# Patient Record
Sex: MALE | Race: OTHER
[De-identification: names, ages, dates, MRNs, and addresses within clinical notes are randomized per-mention and may not be internally consistent; named-entity substitution may affect disease eponyms.]

---

## 2020-08-25 ENCOUNTER — HOSPITAL ENCOUNTER (EMERGENCY)
Dept: HOSPITAL 74 - JER | Age: 25
LOS: 1 days | Discharge: HOME | End: 2020-08-26
Payer: COMMERCIAL

## 2020-08-25 VITALS — BODY MASS INDEX: 27.5 KG/M2

## 2020-08-25 VITALS — TEMPERATURE: 99.4 F

## 2020-08-25 DIAGNOSIS — J06.9: Primary | ICD-10-CM

## 2020-08-25 LAB
ALBUMIN SERPL-MCNC: 4.3 G/DL (ref 3.4–5)
ALP SERPL-CCNC: 88 U/L (ref 45–117)
ALT SERPL-CCNC: 31 U/L (ref 13–61)
ANION GAP SERPL CALC-SCNC: 10 MMOL/L (ref 8–16)
AST SERPL-CCNC: 23 U/L (ref 15–37)
BASOPHILS # BLD: 0.5 % (ref 0–2)
BILIRUB SERPL-MCNC: 0.4 MG/DL (ref 0.2–1)
BUN SERPL-MCNC: 12.3 MG/DL (ref 7–18)
CALCIUM SERPL-MCNC: 9.4 MG/DL (ref 8.5–10.1)
CHLORIDE SERPL-SCNC: 104 MMOL/L (ref 98–107)
CO2 SERPL-SCNC: 25 MMOL/L (ref 21–32)
CREAT SERPL-MCNC: 1.2 MG/DL (ref 0.55–1.3)
DEPRECATED RDW RBC AUTO: 13.6 % (ref 11.9–15.9)
EOSINOPHIL # BLD: 1.4 % (ref 0–4.5)
GLUCOSE SERPL-MCNC: 118 MG/DL (ref 74–106)
HCT VFR BLD CALC: 54.2 % (ref 35.4–49)
HGB BLD-MCNC: 17.5 GM/DL (ref 11.7–16.9)
INR BLD: 1.15 (ref 0.83–1.09)
LYMPHOCYTES # BLD: 18.4 % (ref 8–40)
MCH RBC QN AUTO: 25.6 PG (ref 25.7–33.7)
MCHC RBC AUTO-ENTMCNC: 32.3 G/DL (ref 32–35.9)
MCV RBC: 79.3 FL (ref 80–96)
MONOCYTES # BLD AUTO: 14.3 % (ref 3.8–10.2)
NEUTROPHILS # BLD: 65.4 % (ref 42.8–82.8)
PLATELET # BLD AUTO: 277 K/MM3 (ref 134–434)
PLATELET BLD QL SMEAR: NORMAL
PMV BLD: 9.7 FL (ref 7.5–11.1)
POTASSIUM SERPLBLD-SCNC: 3.7 MMOL/L (ref 3.5–5.1)
PROT SERPL-MCNC: 8.5 G/DL (ref 6.4–8.2)
PT PNL PPP: 13.6 SEC (ref 9.7–13)
RBC # BLD AUTO: 6.84 M/MM3 (ref 4–5.6)
SODIUM SERPL-SCNC: 140 MMOL/L (ref 136–145)
WBC # BLD AUTO: 6.6 K/MM3 (ref 4–10)

## 2020-08-25 PROCEDURE — 3E0337Z INTRODUCTION OF ELECTROLYTIC AND WATER BALANCE SUBSTANCE INTO PERIPHERAL VEIN, PERCUTANEOUS APPROACH: ICD-10-PCS

## 2020-08-25 PROCEDURE — U0003 INFECTIOUS AGENT DETECTION BY NUCLEIC ACID (DNA OR RNA); SEVERE ACUTE RESPIRATORY SYNDROME CORONAVIRUS 2 (SARS-COV-2) (CORONAVIRUS DISEASE [COVID-19]), AMPLIFIED PROBE TECHNIQUE, MAKING USE OF HIGH THROUGHPUT TECHNOLOGIES AS DESCRIBED BY CMS-2020-01-R: HCPCS

## 2020-08-25 NOTE — PDOC
*Physical Exam





- Vital Signs


                                Last Vital Signs











Temp Pulse Resp BP Pulse Ox


 


 99.4 F   140 H  20   159/110 H  100 


 


 08/25/20 18:40  08/25/20 18:40  08/25/20 18:40  08/25/20 18:40  08/25/20 18:40














ED Treatment Course





- LABORATORY


CBC & Chemistry Diagram: 


                                 08/25/20 21:30





                                 08/25/20 21:30





- ADDITIONAL ORDERS


Additional order review: 


                               Laboratory  Results











  08/25/20 08/25/20





  21:30 21:30


 


PT with INR   13.60 H


 


INR   1.15 H


 


Sodium  140 


 


Potassium  3.7 


 


Chloride  104 


 


Carbon Dioxide  25 


 


Anion Gap  10 


 


BUN  12.3 


 


Creatinine  1.2 


 


Est GFR (CKD-EPI)AfAm  98.89 


 


Est GFR (CKD-EPI)NonAf  85.32 


 


Random Glucose  118 H 


 


Calcium  9.4 


 


Total Bilirubin  0.4 


 


AST  23 


 


ALT  31 


 


Alkaline Phosphatase  88 


 


Total Protein  8.5 H 


 


Albumin  4.3 


 


TSH  1.11 








                                        











  08/25/20





  21:30


 


RBC  6.84 H


 


MCV  79.3 L


 


MCHC  32.3


 


RDW  13.6


 


MPV  9.7


 


Neutrophils %  65.4


 


Lymphocytes %  18.4


 


Monocytes %  14.3 H


 


Eosinophils %  1.4


 


Basophils %  0.5














- Medications


Given in the ED: 


ED Medications














Discontinued Medications














Generic Name Dose Route Start Last Admin





  Trade Name Jerod  PRN Reason Stop Dose Admin


 


Acetaminophen  975 mg  08/25/20 18:44  08/25/20 20:30





  Tylenol -  PO  08/25/20 18:45  975 mg





  ONCE ONE   Administration


 


Sodium Chloride  1,000 mls @ 1,000 mls/hr  08/25/20 22:30  08/25/20 23:27





  Normal Saline -  IV  08/25/20 23:29  1,000 mls/hr





  ASDIR STA   Administration


 


Pseudoephedrine HCl  30 mg  08/25/20 20:21  08/25/20 20:45





  Sudafed -  PO  08/25/20 20:22  30 mg





  ONCE ONE   Administration


 


Sodium Chloride  1,000 ml  08/25/20 18:44  08/25/20 20:30





  Normal Saline -  IV  08/25/20 18:45  1,000 ml





  ONCE ONE   Administration














Medical Decision Making





- Medical Decision Making





08/25/20 23:49


Signed out from Dr Yousif





25y previously healthy M presenting w 1d nasal congestion, SOB, non productive 

cough. Tachycardic 140 on admission


Likely covid (infiltrates XR) vs PE vs hyperthyroid





Given 1L NS, tylenol, sudafed





Signed out to reassess and monitor vitals





08/25/20 23:50


 following 2nd ivf bolus


patient feels well and states he feels nervous. no chest pain, sob, palp. 

requesting to go home. shared decision making process. Will return to the ED if 

acute worsening of symptoms








Discharge





- Discharge Information


Problems reviewed: Yes


Clinical Impression/Diagnosis: 


 Viral syndrome





Condition: Improved


Disposition: HOME





- Follow up/Referral





- Patient Discharge Instructions


Patient Printed Discharge Instructions:  [facility]-Coronavirus Instructions, [facility]-

[facility] COVID-19 Isolation Protocol


Additional Instructions: 


You may have the coronavirus infection





Please self quarantine for the next 14 days


You will be contacted with the results of your covid test in the next few days





Drink lots of water. Take tylenol 650mg every 6 hours as needed





Come back to the ED if you cannot breathe, persistent diarrhea, or severe chest 

pain





---








Puede tener la infeccin por coronavirus





Pngase en cuarentena sunday los prximos 14 harper


Ser contactado con los resultados de quinones prueba de covid en los prximos harper





Beber mucha agua. Nedra tylenol. 650mg cada 6 horas si necesitas





Regrese al servicio de urgencias si no puede respirar, si tiene diarrea persiste

nte o dolor de pecho intenso


Print Language: Kyrgyz





- Post Discharge Activity

## 2020-08-25 NOTE — PDOC
Documentation entered by Chery Genao SCRIBE, acting as scribe for 

Monique Persaud MD.








Monique Pesraud MD:  This documentation has been prepared by the Birgit prado Xhesika, SCRIBE, under my direction and personally reviewed by me in its 

entirety.  I confirm that the documentation accurately reflects all work, 

treatment, procedures, and medical decision making performed by me.  





Attending Attestation





- Resident


Resident Name: Phil Yousif





- ED Attending Attestation


I have performed the following: I have examined & evaluated the patient, The 

case was reviewed & discussed with the resident, I agree w/resident's findings &

plan, Exceptions are as noted





- HPI


HPI: 





08/25/20 20:03


The patient is a 25y/o M with no pmh who presents to the ED with nasal 

congestion, nonproductive cough, and SOB x1day. Pt states he took Tylenol this 

morning with no improvement of symptoms. Pt denies any recent COVID exposures.


The patient denies chest pain,  headache and dizziness. Denies fever, nausea, 

vomiting, diarrhea and constipation. Denies dysuria, frequency, urgency and 

hematuria.





Allergies: NKDA








- Physicial Exam


PE: 





08/25/20 20:30


GENERAL: tired but nontoxic-appearing young adult male, A/Ox4, no distress, 

answers questions appropriately


HEENT: PERRLA, EOMI, a bit dry mucous membranes


NECK/BACK: no midline ttp, no spinal step-off or deformity, no hematoma, full 

ROM, neck supple


CARDIOVASCULAR: apid regular rate, no MGR, strong peripheral pulses, capillary 

refill <2 seconds, extremities wwp, no edema


LUNGS/RESPIRATORY: no respiratory distress, CTAB


GI/ABDOMEN: symmetric side-to-side, normoactive BS, soft, no ttp, no midline 

pulsatile masses


: no CVA tenderness


MSK/EXTREMITIES: no muscle atrophy, no acute deformity


SKIN: warm and dry, no pallor, no jaundice, no rash, no pathologic-appearing 

bruising, no skin breakdown, no cuts, no lesions


NEUROLOGICAL: GCS 15, CN II-XII grossly intact, 5/5 strength proximally and 

distally, no facial droop








- Medical Decision Making





08/25/20 20:31


Patient presents with fever, SOB, cough, c/f COVID-19 in the setting of COVID-19

pandemic.





                               Initial Vital Signs











Temp Pulse Resp BP Pulse Ox


 


 99.4 F   140 H  20   159/110 H  100 


 


 08/25/20 18:40  08/25/20 18:40  08/25/20 18:40  08/25/20 18:40  08/25/20 18:40








DDX IBNLT: likely COVID-19 with c/f sequelae (ARDS, myocarditis). Superimposed 

bacterial PNA considered as well. Less likely influenza, bronchitis, other viral

URI, laryngitis, tracheitis, etc. Initially patient is tachycardic and with 

mildly elevated oral temp, no desaturations, so unlikely PE but will check 

rectal temp and fluid resuscitate and give Tylenol and re-assess VS.





                                 Provider Orders











 Category Date Time Status


 


 ELECTROCARDIOGRAM [CARD] Stat Cardiology  08/25/20 18:43 Ordered


 


 EKG needed NOW Care  08/25/20 18:44 Completed


 


 Isolation Precautions As directed Care  08/25/20 18:44 Active


 


 CBC WITH DIFFERENTIAL Stat Lab  08/25/20 21:30 Completed


 


 COMP METABOLIC PANEL Stat Lab  08/25/20 21:30 Completed


 


 COVID-19 Stat Lab  08/25/20 21:30 Received


 


 PT/INR (PROTHROMBIN TIME) Stat Lab  08/25/20 21:30 Completed


 


 THYROID STIMULATING HORMONE Stat Lab  08/25/20 21:30 Completed


 


 Acetaminophen [Tylenol -] Medication  08/25/20 20:42 Discontinued





 975 mg .ROUTE .STK-MED ONE   


 


 Acetaminophen [Tylenol -] Medication  08/25/20 18:44 Discontinued





 975 mg PO ONCE ONE   


 


 Pseudoephedrine HCl [Sudafed -] Medication  08/25/20 20:21 Discontinued





 30 mg PO ONCE ONE   


 


 Pseudoephedrine HCl [Sudafed -] Medication  08/25/20 20:43 Discontinued





 60 mg .ROUTE .STK-MED ONE   


 


 Sodium Chloride [Normal Saline -] Medication  08/25/20 18:44 Discontinued





 1,000 ml IV ONCE ONE   


 


 Sodium Chloride [Normal Saline -] 1,000 ml Medication  08/25/20 22:30 

Discontinued





 IV ASDIR   


 


 IV Insert NOW Phy Order  08/25/20 18:44 Active


 


 CHEST X-RAY PORTABLE* [RAD] Stat Radiology  08/25/20 20:00 Taken








                                   Medications











Discontinued Medications














Generic Name Dose Route Start Last Admin





  Trade Name Freq  PRN Reason Stop Dose Admin


 


Acetaminophen  975 mg  08/25/20 18:44  08/25/20 20:30





  Tylenol -  PO  08/25/20 18:45  975 mg





  ONCE ONE   Administration


 


Acetaminophen  Confirm  08/25/20 20:42 





  Tylenol -  Administered  08/25/20 20:43 





  Dose  





  975 mg  





  .ROUTE  





  .STK-MED ONE  


 


Sodium Chloride  1,000 mls @ 1,000 mls/hr  08/25/20 22:30  08/25/20 23:27





  Normal Saline -  IV  08/25/20 23:29  1,000 mls/hr





  ASDIR STA   Administration


 


Pseudoephedrine HCl  30 mg  08/25/20 20:21  08/25/20 20:45





  Sudafed -  PO  08/25/20 20:22  30 mg





  ONCE ONE   Administration


 


Pseudoephedrine HCl  Confirm  08/25/20 20:43 





  Sudafed -  Administered  08/25/20 20:44 





  Dose  





  60 mg  





  .ROUTE  





  .STK-MED ONE  


 


Sodium Chloride  1,000 ml  08/25/20 18:44  08/25/20 20:30





  Normal Saline -  IV  08/25/20 18:45  1,000 ml





  ONCE ONE   Administration








                                   Lab Results











WBC  6.6 K/mm3 (4.0-10.0)   08/25/20  21:30    


 


RBC  6.84 M/mm3 (4.00-5.60)  H  08/25/20  21:30    


 


Hgb  17.5 GM/dL (11.7-16.9)  H  08/25/20  21:30    


 


Hct  54.2 % (35.4-49)  H  08/25/20  21:30    


 


MCV  79.3 fl (80-96)  L  08/25/20  21:30    


 


MCH  25.6 pg (25.7-33.7)  L  08/25/20  21:30    


 


MCHC  32.3 g/dl (32.0-35.9)   08/25/20  21:30    


 


RDW  13.6 % (11.9-15.9)   08/25/20  21:30    


 


Plt Count  277 K/MM3 (134-434)   08/25/20  21:30    


 


MPV  9.7 fl (7.5-11.1)   08/25/20  21:30    


 


Absolute Neuts (auto)  4.3 K/mm3 (1.5-8.0)   08/25/20  21:30    


 


Neutrophils %  65.4 % (42.8-82.8)   08/25/20  21:30    


 


Lymphocytes %  18.4 % (8-40)   08/25/20 21:30    


 


Monocytes %  14.3 % (3.8-10.2)  H  08/25/20  21:30    


 


Eosinophils %  1.4 % (0-4.5)   08/25/20 21:30    


 


Basophils %  0.5 % (0-2.0)   08/25/20 21:30    


 


Nucleated RBC %  0 % (0-0)   08/25/20 21:30    


 


Platelet Estimate  Normal   08/25/20 21:30    


 


Platelet Comment  Present   08/25/20 21:30    


 


PT with INR  13.60 SEC (9.7-13.0)  H  08/25/20 21:30    


 


INR  1.15  (0.83-1.09)  H  08/25/20  21:30    


 


Sodium  140 mmol/L (136-145)   08/25/20 21:30    


 


Potassium  3.7 mmol/L (3.5-5.1)   08/25/20 21:30    


 


Chloride  104 mmol/L ()   08/25/20 21:30    


 


Carbon Dioxide  25 mmol/L (21-32)   08/25/20 21:30    


 


Anion Gap  10 MMOL/L (8-16)   08/25/20 21:30    


 


BUN  12.3 mg/dL (7-18)   08/25/20  21:30    


 


Creatinine  1.2 mg/dL (0.55-1.3)   08/25/20 21:30    


 


Est GFR (CKD-EPI)AfAm  98.89   08/25/20 21:30    


 


Est GFR (CKD-EPI)NonAf  85.32   08/25/20  21:30    


 


Random Glucose  118 mg/dL ()  H  08/25/20  21:30    


 


Calcium  9.4 mg/dL (8.5-10.1)   08/25/20  21:30    


 


Total Bilirubin  0.4 mg/dL (0.2-1)   08/25/20  21:30    


 


AST  23 U/L (15-37)   08/25/20  21:30    


 


ALT  31 U/L (13-61)   08/25/20 21:30    


 


Alkaline Phosphatase  88 U/L ()   08/25/20  21:30    


 


Total Protein  8.5 g/dl (6.4-8.2)  H  08/25/20  21:30    


 


Albumin  4.3 g/dl (3.4-5.0)   08/25/20  21:30    


 


TSH  1.11 uIU/ml (0.358-3.74)   08/25/20  21:30    








CXR: Diffuse bilateral patchy consolidations without focal or lobar 

consolidation.





08/25/20 21:01


At this time patient's HR is 100.





08/26/20 00:10


Patient's repeat HR after 2nd liter IVF is 104. He states he is anxious and does

 not like hospitals. No SOB, no chest pain





This Pt has gotten significant relief of symptoms while in the ED. The patient 

has no new hypoxia, significant respiratory distress, or other sxs requiring 

admission. On last reassessment, vitals are wnl and exam is benign. Workup is 

not concerning for emergency-level pathology at this time. This Pt is 

appropriate for discharge with close outpatient follow up. They are comfortable 

with this plan and will call PCP within 2 days for follow-up conversation. They 

are instructed on importance of quarantine and further outpatient testing. 

Discharge instructions for COVID-19 patients and household members given. 

Specific return precautions are discussed and they will come back to the ER if 

necessary.





**Heart Score/ECG Review


  ** #1





08/25/20 20:34


Sinus tachycardia, rate 113, normal axis and intervals, no ischemic ST-T changes





Discharge





- Discharge Information


Problems reviewed: Yes


Clinical Impression/Diagnosis: 


 Viral syndrome





Condition: Improved


Disposition: HOME





- Admission


No





- Follow up/Referral





- Patient Discharge Instructions


Patient Printed Discharge Instructions:  [facility]-Coronavirus Instructions, [facility]-

[facility] COVID-19 Isolation Protocol


Additional Instructions: 


You may have the coronavirus infection





Please self quarantine for the next 14 days


You will be contacted with the results of your covid test in the next few days





Drink lots of water. Take tylenol 650mg every 6 hours as needed





Come back to the ED if you cannot breathe, persistent diarrhea, or severe chest 

pain





---








Puede tener la infeccin por coronavirus





Pngase en cuarentena sunday los prximos 14 harper


Ser contactado con los resultados de quinones prueba de covid en los prximos harper





Beber mucha agua. Nedra tylenol. 650mg cada 6 horas si necesitas





Regrese al servicio de urgencias si no puede respirar, si tiene diarrea 

persistente o dolor de pecho intenso


Print Language: Ghanaian





- Post Discharge Activity


Work/Back to School Note:  Back to Work

## 2020-08-25 NOTE — PDOC
Rapid Medical Evaluation


Time Seen by Provider: 08/25/20 18:37


Medical Evaluation: 





08/25/20 18:37


HPI: 25 year old male no pmhx presenting with nasal congestion chills with loss 

of taste of smell





PE:


tachycardia 


CTA





A/P:


tachcardia to 140's 


O2 sat 100


Basic labs


Fluids 


Chest XR 


EKG





Pt to precede to ED for further evaluation and treatment.

## 2020-08-25 NOTE — PDOC
History of Present Illness





- General


Chief Complaint: Cold Symptoms


Stated Complaint: COUGH/CONGESTION


Time Seen by Provider: 08/25/20 18:37


History Source: Patient


Exam Limitations: No Limitations





- History of Present Illness


Initial Comments: 





08/25/20 20:00


25y previously healthy M presenting w 1d nasal congestion, SOB, non productive 

cough. Took tylenol this morning w/o relief. Denies recent covid exposure. 

Denies fever, n/v, chest, ABD pain, urinary/bowel mvmt changes. 





Past History





- Medical History


Allergies/Adverse Reactions: 


                                    Allergies











Allergy/AdvReac Type Severity Reaction Status Date / Time


 


No Known Allergies Allergy   Verified 08/25/20 18:39











COPD: No





- Immunization History


Immunization Up to Date: No





- Psycho-Social/Smoking History


Smoking History: Former smoker


Have you smoked in the past 12 months: No


Information on smoking cessation initiated: No





- Substance Abuse Hx (Audit-C & DAST Scrn)


How often the patient has a drink containing alcohol: Never


Score: In Men: 4 or > Positive; In Women: 3 or > Positive: 0


Screen Result (Pos requires Nsg. Audit-10AR): Negative


In the last yr the pt used illegal drug/Rx for NonMed reason: No


Score:  Yes response is considered Positive: 0


Screen Result (Positive result requires Nsg. DAST-10): Negative





**Review of Systems





- Review of Systems


Constitutional: No: Chills, Fever


HEENTM: Yes: Nose Congestion.  No: Eye Pain


Respiratory: Yes: Cough, Shortness of Breath


Cardiac (ROS): No: Chest Pain, Palpitations


ABD/GI: No: Constipated, Diarrhea, Nausea, Vomiting


: No: Burning, Dysuria


Musculoskeletal: No: Back Pain, Joint Pain


Integumentary: No: Bruising, Flushing


Neurological: No: Headache, Seizure


Psychiatric: No: Anxiety, Depression


Endocrine: No: Intolerance to Cold, Intolerance to Heat


Hematologic/Lymphatic: No: Anemia, Blood Clots





*Physical Exam





- Vital Signs


                                Last Vital Signs











Temp Pulse Resp BP Pulse Ox


 


 99.4 F   140 H  20   159/110 H  100 


 


 08/25/20 18:40  08/25/20 18:40  08/25/20 18:40  08/25/20 18:40  08/25/20 18:40














- Physical Exam


General Appearance: Yes: Nourished, Appropriately Dressed, Mild Distress


HEENT: positive: EOMI, DAVI, Normal Voice, Nasal Congestion, Hearing Grossly 

Normal.  negative: Scleral Icterus (R), Scleral Icterus (L)


Respiratory/Chest: positive: Lungs Clear, Normal Breath Sounds.  negative: Chest

Tender, Respiratory Distress


Cardiovascular: positive: Regular Rhythm, S1, S2, Tachycardia.  negative: Murmur


Gastrointestinal/Abdominal: positive: Normal Bowel Sounds, Flat, Soft.  

negative: Tender, Organomegaly


Extremity: positive: Delayed Capillary Refill


Integumentary: positive: Normal Color, Dry, Warm


Neurologic: positive: Fully Oriented, Alert, Normal Mood/Affect, Normal 

Response, Responsive





ED Treatment Course





- LABORATORY


CBC & Chemistry Diagram: 


                                 08/25/20 21:30





                                 08/25/20 21:30





Medical Decision Making





- Medical Decision Making





08/25/20 20:24


EKG - sinus tachycardia, , QTc 441, no ST changes


CXR - prakash fluffy hilar infiltrates





---





25y previously healthy M presenting w 1d nasal congestion, SOB, non productive 

cough. Tachycardic 140 on admission


Likely covid (infiltrates XR) vs PE vs hyperthyroid





Given 1L NS, tylenol, sudafed





Anticipate DC home





Signed out to night team


- consider d-dimer if pt persistently tachycardic after fluids given








Discharge





- Discharge Information


Problems reviewed: Yes


Clinical Impression/Diagnosis: 


 Suspected 2019 novel coronavirus infection





Condition: Improved


Disposition: HOME





- Follow up/Referral





- Patient Discharge Instructions


Patient Printed Discharge Instructions:  [facility]-Coronavirus Instructions, [facility]-

[facility] COVID-19 Isolation Protocol


Additional Instructions: 


You may have the coronavirus infection





Please self quarantine for the next 14 days


You will be contacted with the results of your covid test in the next few days





Drink lots of water





Come back to the ED if you cannot breathe, persistent diarrhea, or severe chest 

pain





---








Puede tener la infeccin por coronavirus





Pngase en cuarentena sunday los prximos 14 harper


Ser contactado con los resultados de quinones prueba de covid en los prximos harper





Beber mucha agua





Regrese al servicio de urgencias si no puede respirar, si tiene diarrea 

persistente o dolor de pecho intenso





- Post Discharge Activity

## 2020-08-26 VITALS — HEART RATE: 94 BPM | SYSTOLIC BLOOD PRESSURE: 124 MMHG | DIASTOLIC BLOOD PRESSURE: 72 MMHG

## 2020-08-26 NOTE — EKG
Test Reason : 

Blood Pressure : ***/*** mmHG

Vent. Rate : 113 BPM     Atrial Rate : 113 BPM

   P-R Int : 136 ms          QRS Dur : 074 ms

    QT Int : 322 ms       P-R-T Axes : 060 049 058 degrees

   QTc Int : 441 ms

 

SINUS TACHYCARDIA

POSSIBLE LEFT ATRIAL ENLARGEMENT

BORDERLINE ECG

NO PREVIOUS ECGS AVAILABLE

Confirmed by MD Konstantin, Levi ([id number]) on 8/26/2020 10:04:39 AM

 

Referred By:             Confirmed By:Levi Pryor MD

## 2021-03-29 ENCOUNTER — HOSPITAL ENCOUNTER (EMERGENCY)
Dept: HOSPITAL 74 - JERFT | Age: 26
Discharge: HOME | End: 2021-03-29
Payer: COMMERCIAL

## 2021-03-29 VITALS — BODY MASS INDEX: 26.9 KG/M2

## 2021-03-29 VITALS — TEMPERATURE: 99.5 F | DIASTOLIC BLOOD PRESSURE: 87 MMHG | HEART RATE: 98 BPM | SYSTOLIC BLOOD PRESSURE: 144 MMHG

## 2021-03-29 DIAGNOSIS — R07.0: ICD-10-CM

## 2021-03-29 DIAGNOSIS — R21: Primary | ICD-10-CM

## 2025-04-04 ENCOUNTER — HOSPITAL ENCOUNTER (EMERGENCY)
Dept: HOSPITAL 74 - JER | Age: 30
Discharge: HOME | End: 2025-04-04
Payer: COMMERCIAL

## 2025-04-04 VITALS — SYSTOLIC BLOOD PRESSURE: 134 MMHG | HEART RATE: 106 BPM | DIASTOLIC BLOOD PRESSURE: 89 MMHG

## 2025-04-04 VITALS — BODY MASS INDEX: 19.8 KG/M2

## 2025-04-04 VITALS — RESPIRATION RATE: 18 BRPM | TEMPERATURE: 98.1 F

## 2025-04-04 DIAGNOSIS — J18.9: Primary | ICD-10-CM

## 2025-04-04 DIAGNOSIS — R91.8: ICD-10-CM

## 2025-04-04 DIAGNOSIS — R00.0: ICD-10-CM

## 2025-04-04 LAB
ALBUMIN SERPL-MCNC: 3.4 G/DL (ref 3.4–5)
APPEARANCE UR: CLEAR
APTT BLD: 31.9 SECONDS (ref 25.2–36.5)
BILIRUB SERPL-MCNC: 0.4 MG/DL (ref 0.2–1)
BILIRUB UR STRIP.AUTO-MCNC: NEGATIVE MG/DL
CALCIUM SERPL-MCNC: 9.3 MG/DL (ref 8.5–10.1)
COLOR UR: YELLOW
ERYTHROCYTE [DISTWIDTH] IN BLOOD: 14.4 % (ref 11.9–15.3)
HGB BLD-MCNC: 14.1 G/DL (ref 13.7–17.5)
INR BLD: 1.3 (ref 0.83–1.09)
KETONES UR QL STRIP: (no result)
LEUKOCYTE ESTERASE UR QL STRIP.AUTO: NEGATIVE
MCHC RBC-ENTMCNC: 30.7 G/DL (ref 32.3–36.5)
MCV RBC: 76.9 FL (ref 79–92.2)
NITRITE UR QL STRIP: NEGATIVE
PLATELET # BLD AUTO: 409 X10^3/UL (ref 163–337)
PMV BLD: 9.4 FL (ref 9.4–12.4)
POTASSIUM SERPLBLD-SCNC: 3.8 MMOL/L (ref 3.5–5.1)
PROT SERPL-MCNC: 9.4 G/DL (ref 6.4–8.2)
PROT UR QL STRIP: NEGATIVE
PROT UR QL STRIP: NEGATIVE
PT PNL PPP: 14.2 SEC (ref 9.7–13)
SP GR UR: 1.03 (ref 1.01–1.03)
UROBILINOGEN UR STRIP-MCNC: 0.2 MG/DL (ref 0.2–1)

## 2025-04-04 PROCEDURE — 3E0337Z INTRODUCTION OF ELECTROLYTIC AND WATER BALANCE SUBSTANCE INTO PERIPHERAL VEIN, PERCUTANEOUS APPROACH: ICD-10-PCS

## 2025-04-04 RX ADMIN — SODIUM CHLORIDE STA MLS/HR: 9 INJECTION, SOLUTION INTRAVENOUS at 11:39

## 2025-06-06 ENCOUNTER — HOSPITAL ENCOUNTER (INPATIENT)
Dept: HOSPITAL 74 - JER | Age: 30
LOS: 1 days | Discharge: ANOTHER HOSPITAL | DRG: 892 | End: 2025-06-07
Attending: INTERNAL MEDICINE | Admitting: HOSPITALIST
Payer: COMMERCIAL

## 2025-06-06 VITALS — BODY MASS INDEX: 19.1 KG/M2

## 2025-06-06 VITALS — RESPIRATION RATE: 18 BRPM

## 2025-06-06 DIAGNOSIS — Z72.0: ICD-10-CM

## 2025-06-06 DIAGNOSIS — A31.0: Primary | ICD-10-CM

## 2025-06-06 DIAGNOSIS — R00.0: ICD-10-CM

## 2025-06-06 DIAGNOSIS — D50.9: ICD-10-CM

## 2025-06-06 DIAGNOSIS — E05.90: ICD-10-CM

## 2025-06-06 DIAGNOSIS — R91.8: ICD-10-CM

## 2025-06-06 DIAGNOSIS — B20: ICD-10-CM

## 2025-06-06 DIAGNOSIS — J90: ICD-10-CM

## 2025-06-06 LAB
ALBUMIN SERPL-MCNC: 2.7 G/DL (ref 3.4–5)
ALP SERPL-CCNC: 82 U/L (ref 45–117)
ALT SERPL-CCNC: 41 U/L (ref 13–61)
ANION GAP SERPL CALC-SCNC: 9 MMOL/L (ref 4–13)
APTT BLD: 30.8 SECONDS (ref 25.2–36.5)
AST SERPL-CCNC: 30 U/L (ref 15–37)
BASOPHILS # BLD AUTO: 0.03 X10^3/UL (ref 0.01–0.08)
BILIRUB SERPL-MCNC: 0.3 MG/DL (ref 0.2–1)
BUN SERPL-MCNC: 6.8 MG/DL (ref 7–18)
CALCIUM SERPL-MCNC: 9.3 MG/DL (ref 8.5–10.1)
CHLORIDE SERPL-SCNC: 106 MMOL/L (ref 98–107)
CO2 SERPL-SCNC: 23 MMOL/L (ref 21–32)
CREAT SERPL-MCNC: 0.7 MG/DL (ref 0.55–1.3)
EOSINOPHIL # BLD AUTO: 0.13 X10^3/UL (ref 0.04–0.54)
EOSINOPHIL NFR BLD AUTO: 1.5 % (ref 0.8–7)
ERYTHROCYTE [DISTWIDTH] IN BLOOD: 15.8 % (ref 11.9–15.3)
GLUCOSE SERPL-MCNC: 114 MG/DL (ref 74–106)
HCT VFR BLD CALC: 38.7 % (ref 40.1–51)
HGB BLD-MCNC: 12.1 G/DL (ref 13.7–17.5)
IMM GRANULOCYTES # BLD: 0.05 X10^3/UL (ref 0–0.03)
INR BLD: 1.6 (ref 0.83–1.09)
MCHC RBC-ENTMCNC: 31.3 G/DL (ref 32.3–36.5)
MCV RBC: 72.2 FL (ref 79–92.2)
MONOCYTES # BLD AUTO: 1.44 X10^3/UL (ref 0.3–0.82)
MONOCYTES NFR BLD AUTO: 16.6 % (ref 5.3–12.2)
PLATELET # BLD AUTO: 458 X10^3/UL (ref 163–337)
PLATELETS.RETICULATED NFR BLD AUTO: (no result) % (ref 0.9–11.2)
PLATELETS.RETICULATED NFR BLD AUTO: (no result) X10^3/UL
PMV BLD: 9.4 FL (ref 9.4–12.4)
POTASSIUM SERPLBLD-SCNC: 3.7 MMOL/L (ref 3.5–5.1)
PROT SERPL-MCNC: 8.3 G/DL (ref 6.4–8.2)
PT PNL PPP: 17.6 SEC (ref 9.7–13)
SODIUM SERPL-SCNC: 139 MMOL/L (ref 136–145)

## 2025-06-07 VITALS — HEART RATE: 86 BPM | DIASTOLIC BLOOD PRESSURE: 79 MMHG | TEMPERATURE: 97.7 F | SYSTOLIC BLOOD PRESSURE: 119 MMHG

## 2025-06-07 LAB
ALBUMIN SERPL-MCNC: 2.6 G/DL (ref 3.4–5)
ALP SERPL-CCNC: 77 U/L (ref 45–117)
ALT SERPL-CCNC: 41 U/L (ref 13–61)
ANION GAP SERPL CALC-SCNC: 6 MMOL/L (ref 4–13)
AST SERPL-CCNC: 26 U/L (ref 15–37)
BASOPHILS # BLD AUTO: 0.03 X10^3/UL (ref 0.01–0.08)
BILIRUB SERPL-MCNC: 0.5 MG/DL (ref 0.2–1)
BUN SERPL-MCNC: 6.7 MG/DL (ref 7–18)
CALCIUM SERPL-MCNC: 9.3 MG/DL (ref 8.5–10.1)
CHLORIDE SERPL-SCNC: 104 MMOL/L (ref 98–107)
CO2 SERPL-SCNC: 26 MMOL/L (ref 21–32)
CREAT SERPL-MCNC: 0.8 MG/DL (ref 0.55–1.3)
EOSINOPHIL # BLD AUTO: 0.25 X10^3/UL (ref 0.04–0.54)
EOSINOPHIL NFR BLD AUTO: 3.2 % (ref 0.8–7)
ERYTHROCYTE [DISTWIDTH] IN BLOOD: 15.7 % (ref 11.9–15.3)
GLUCOSE SERPL-MCNC: 104 MG/DL (ref 74–106)
HCT VFR BLD CALC: 40.8 % (ref 40.1–51)
HGB BLD-MCNC: 12.7 G/DL (ref 13.7–17.5)
IMM GRANULOCYTES # BLD: 0.03 X10^3/UL (ref 0–0.03)
IRON SERPL-MCNC: 44 UG/DL (ref 50–175)
MAGNESIUM SERPL-MCNC: 2.1 MG/DL (ref 1.8–2.4)
MCHC RBC-ENTMCNC: 31.1 G/DL (ref 32.3–36.5)
MCV RBC: 72.1 FL (ref 79–92.2)
MONOCYTES # BLD AUTO: 1.29 X10^3/UL (ref 0.3–0.82)
MONOCYTES NFR BLD AUTO: 16.6 % (ref 5.3–12.2)
PHOSPHATE SERPL-MCNC: 4 MG/DL (ref 2.5–4.9)
PLATELET # BLD AUTO: 484 X10^3/UL (ref 163–337)
PLATELETS.RETICULATED NFR BLD AUTO: (no result) % (ref 0.9–11.2)
PLATELETS.RETICULATED NFR BLD AUTO: (no result) X10^3/UL
PMV BLD: 9.4 FL (ref 9.4–12.4)
POTASSIUM SERPLBLD-SCNC: 3.8 MMOL/L (ref 3.5–5.1)
PROT SERPL-MCNC: 8.2 G/DL (ref 6.4–8.2)
RETICS # AUTO: 0.83 % (ref 0.51–1.81)
SODIUM SERPL-SCNC: 136 MMOL/L (ref 136–145)
TIBC SERPL-MCNC: 169 UG/DL (ref 250–450)

## 2025-06-07 RX ADMIN — ENOXAPARIN SODIUM SCH MG: 40 INJECTION SUBCUTANEOUS at 10:33

## 2025-06-07 RX ADMIN — SULFAMETHOXAZOLE AND TRIMETHOPRIM SCH EACH: 800; 160 TABLET ORAL at 10:32

## 2025-06-07 RX ADMIN — MULTIVITAMIN TABLET SCH TAB: TABLET at 10:33

## 2025-06-07 RX ADMIN — AZITHROMYCIN SCH MG: 250 TABLET, FILM COATED ORAL at 10:32

## 2025-06-07 RX ADMIN — SODIUM CHLORIDE SCH MLS/HR: 9 INJECTION, SOLUTION INTRAVENOUS at 01:50

## 2025-06-07 RX ADMIN — BICTEGRAVIR SODIUM, EMTRICITABINE, AND TENOFOVIR ALAFENAMIDE FUMARATE SCH EACH: 50; 200; 25 TABLET ORAL at 10:32

## 2025-06-07 RX ADMIN — ETHAMBUTOL HYDROCHLORIDE SCH MG: 400 TABLET ORAL at 10:33

## 2025-06-07 RX ADMIN — CHOLECALCIFEROL TAB 10 MCG (400 UNIT) SCH UNIT: 10 TAB at 10:32
